# Patient Record
(demographics unavailable — no encounter records)

---

## 2018-01-17 NOTE — PROCEDURES
Procedures by Boo Delgado PA-C at 10/2/2016  2:35 PM      Author:  Boo Delgado PA-C Service:  Surgery-General Author Type:  Physician Assistant    Filed:  10/2/2016  2:45 PM Date of Service:  10/2/2016  2:35 PM Status:  Attested    :  Boo Delgado PA-C (Physician Assistant)  Cosigner:  Caren Suárez MD at 10/9/2016 11:15 AM      Procedure Orders:       1  BLADDER CATHETERIZATION [[id number]] ordered by Boo Delgado PA-C at 10/02/16 1436                 Post-procedure Diagnoses:       1  DAMEON (acute kidney injury) [N17 9]              Attestation signed by Caren Suárez MD at 10/9/2016 11:15 AM           Late entry note:    Patient on lasix drip needed accurate input/output with barriga catheter  Difficult to place barriga cath by nursing staff  Bladder Cath  Date/Time: 10/2/2016 2:17 PM  Performed by: Prudencio Warren by: Kirti Lazaro     Patient location:  Bedside  Other Assisting Provider:  No    Consent:     Consent obtained:  Verbal    Consent given by:  Patient and spouse    Risks discussed:  Infection, pain, urethral injury, incomplete procedure and false passage    Alternatives discussed:  No treatment, delayed treatment, alternative treatment and observation  Universal protocol:     Procedure explained and questions answered to patient or proxy's satisfaction: yes      Patient identity confirmed:  Verbally with patient and arm band  Pre-procedure details:     Procedure purpose:  Therapeutic    Preparation: Patient was prepped and draped in usual sterile fashion    Anesthesia (see MAR for exact dosages):      Anesthesia method:  None  Procedure details:     Provider performed due to:  Prior attempts by nursing unsuccessful    Catheter insertion:  Indwelling    Catheter type:  Coude    Catheter size:  18 Fr    Bladder irrigation: no      Number of attempts:  2    Urine characteristics:  Mildly cloudy  Post-procedure details:     Patient tolerance of procedure: Tolerated well, no immediate complications  Comments:      Coude inserted and urine flow noted into tube, bladder pressure increased urine flow  No urine note to be leaking around catheter      Read JIMENA Vanegas               Received for:Provider  EPIC   Oct  9 2016 11:16AM [place] Standard Time